# Patient Record
Sex: FEMALE | URBAN - METROPOLITAN AREA
[De-identification: names, ages, dates, MRNs, and addresses within clinical notes are randomized per-mention and may not be internally consistent; named-entity substitution may affect disease eponyms.]

---

## 2021-03-31 ENCOUNTER — TELEPHONE ENCOUNTER (OUTPATIENT)
Dept: URBAN - METROPOLITAN AREA CLINIC 35 | Facility: CLINIC | Age: 66
End: 2021-03-31

## 2022-12-12 ENCOUNTER — OFFICE VISIT (OUTPATIENT)
Dept: URBAN - METROPOLITAN AREA CLINIC 78 | Facility: CLINIC | Age: 67
End: 2022-12-12

## 2022-12-12 NOTE — HPI-TODAY'S VISIT:
The patient was referred to us by. A copy of this note will be sent to the referring physician.  The patient has never had a colonoscopy previously. There is no FH of colon cancer or colon polyps.  There is no recent history of rectal bleeding. The patient has no pertinent additional complaints of abdominal pain, constipation, diarrhea, bloating, rectal pain, anorexia or unintentional weight loss.   Regarding any upper GI complaints, the patient has not had heartburn, nausea, vomiting or dysphagia.    The patient does not take blood thinners.  They deny any CP or FORDE.

## 2023-02-08 ENCOUNTER — OFFICE VISIT (OUTPATIENT)
Dept: URBAN - METROPOLITAN AREA CLINIC 98 | Facility: CLINIC | Age: 68
End: 2023-02-08
Payer: COMMERCIAL

## 2023-02-08 VITALS
HEIGHT: 58 IN | TEMPERATURE: 97.8 F | BODY MASS INDEX: 26.55 KG/M2 | DIASTOLIC BLOOD PRESSURE: 69 MMHG | WEIGHT: 126.5 LBS | HEART RATE: 69 BPM | SYSTOLIC BLOOD PRESSURE: 139 MMHG

## 2023-02-08 DIAGNOSIS — K21.9 GERD WITHOUT ESOPHAGITIS: ICD-10-CM

## 2023-02-08 DIAGNOSIS — Z86.010 PERSONAL HISTORY OF COLONIC POLYPS: ICD-10-CM

## 2023-02-08 DIAGNOSIS — R10.32 LLQ ABDOMINAL PAIN: ICD-10-CM

## 2023-02-08 PROCEDURE — 99203 OFFICE O/P NEW LOW 30 MIN: CPT | Performed by: INTERNAL MEDICINE

## 2023-02-08 PROCEDURE — 99243 OFF/OP CNSLTJ NEW/EST LOW 30: CPT | Performed by: INTERNAL MEDICINE

## 2023-02-08 RX ORDER — OMEPRAZOLE 20 MG/1
1 CAPSULE 30 MINUTES BEFORE MORNING MEAL CAPSULE, DELAYED RELEASE ORAL ONCE A DAY
Qty: 30 | OUTPATIENT
Start: 2023-02-10

## 2023-02-08 RX ORDER — SODIUM, POTASSIUM,MAG SULFATES 17.5-3.13G
354ML SOLUTION, RECONSTITUTED, ORAL ORAL
Qty: 345 MILLILITER | Refills: 0 | OUTPATIENT
Start: 2023-02-12 | End: 2023-02-13

## 2023-02-08 RX ORDER — FAMOTIDINE 20 MG
1 CAPSULE TABLET ORAL ONCE A DAY
Status: ACTIVE | COMMUNITY

## 2023-02-08 NOTE — HPI-TODAY'S VISIT:
Patient referred by Zohra Moreno MD for evaluation of chronic GERD and surveillance colonoscopy. Copy of this consult sent to Dr. Moreno. 66 yo pt w PHx malignant polyp 8 yrs ago in [place]. She has had no LGI symptoms and denies melenic stools nor hematochezia. She has no FHx CC  pp / IBD / GI malignancies. She has ongoing MINAL sxs: sore throat. throat clearing, wo dysphagia / odynophagia / HB / R / or nocturnal MINAL sxs. Has been on Omeprazole + antireflux diet w relatively good control of sxs x for above mentioned. Reports p-prandial abdominal borborygmi wo diarrhea and no abdominal pain Denies anorexia or weight loss. Has been on a healthy diet w high fiber and low sugar - CHO. Normal labs 8/22 x for borderline BG. Has lost ~ 10 lbs / 5 mos w diet changes. She had a normal XTT 2 yrs ago ( no hx CVD ), normal DEXA last yr. Had mild COVID-19 12/22 and has received COVID-19 vaccine. No other complaints.

## 2023-02-08 NOTE — PHYSICAL EXAM NECK/THYROID:
normal appearance , without tenderness upon palpation , no deformities , trachea midline , Thyroid normal size , no masses , thyroid nontender
negative...

## 2023-02-16 ENCOUNTER — TELEPHONE ENCOUNTER (OUTPATIENT)
Dept: URBAN - METROPOLITAN AREA CLINIC 98 | Facility: CLINIC | Age: 68
End: 2023-02-16

## 2023-02-22 PROBLEM — 428283002: Status: ACTIVE | Noted: 2023-02-08

## 2023-02-22 PROBLEM — 266435005: Status: ACTIVE | Noted: 2023-02-08

## 2023-03-21 LAB
A/G RATIO: 1.7
ALBUMIN: 4.3
ALKALINE PHOSPHATASE: 77
ALT (SGPT): 17
APPEARANCE: CLEAR
AST (SGOT): 14
BACTERIA: (no result)
BILIRUBIN, TOTAL: 0.5
BILIRUBIN: NEGATIVE
BUN/CREATININE RATIO: (no result)
BUN: 24
C-REACTIVE PROTEIN, QUANT: 0.7
CALCIUM: 10.2
CARBON DIOXIDE, TOTAL: 27
CHLORIDE: 104
COMMENTS: (no result)
COPY(IES) SENT TO:: (no result)
CREATININE: 0.66
EGFR: 97
GLOBULIN, TOTAL: 2.5
GLUCOSE: 113
GLUCOSE: NEGATIVE
HEMATOCRIT: 43.9
HEMOGLOBIN: 14.4
HYALINE CAST: (no result)
KETONES: NEGATIVE
LIPASE: 46
MCH: 30
MCHC: 32.8
MCV: 91.5
MPV: 12.4
NITRITE, URINE: NEGATIVE
OCCULT BLOOD: NEGATIVE
PH: 6
PLATELET COUNT: 226
POTASSIUM: 4.6
PROTEIN, TOTAL: 6.8
PROTEIN: NEGATIVE
RBC: (no result)
RDW: 13
RED BLOOD CELL COUNT: 4.8
SED RATE BY MODIFIED: 2
SODIUM: 140
SPECIFIC GRAVITY: 1.01
SQUAMOUS EPITHELIAL CELLS: (no result)
URINE-COLOR: YELLOW
WBC ESTERASE: (no result)
WBC: (no result)
WHITE BLOOD CELL COUNT: 6.6

## 2023-03-24 ENCOUNTER — CLAIMS CREATED FROM THE CLAIM WINDOW (OUTPATIENT)
Dept: URBAN - METROPOLITAN AREA CLINIC 4 | Facility: CLINIC | Age: 68
End: 2023-03-24
Payer: COMMERCIAL

## 2023-03-24 ENCOUNTER — CLAIMS CREATED FROM THE CLAIM WINDOW (OUTPATIENT)
Dept: URBAN - METROPOLITAN AREA SURGERY CENTER 18 | Facility: SURGERY CENTER | Age: 68
End: 2023-03-24
Payer: COMMERCIAL

## 2023-03-24 ENCOUNTER — OFFICE VISIT (OUTPATIENT)
Dept: URBAN - METROPOLITAN AREA SURGERY CENTER 18 | Facility: SURGERY CENTER | Age: 68
End: 2023-03-24

## 2023-03-24 DIAGNOSIS — K29.70 GASTRITIS, UNSPECIFIED, WITHOUT BLEEDING: ICD-10-CM

## 2023-03-24 DIAGNOSIS — K31.89 ACQUIRED DEFORMITY OF DUODENUM: ICD-10-CM

## 2023-03-24 DIAGNOSIS — K31.89 OTHER DISEASES OF STOMACH AND DUODENUM: ICD-10-CM

## 2023-03-24 DIAGNOSIS — K63.89 OTHER SPECIFIED DISEASES OF INTESTINE: ICD-10-CM

## 2023-03-24 DIAGNOSIS — Z12.11 COLON CANCER SCREENING: ICD-10-CM

## 2023-03-24 DIAGNOSIS — K44.9 DIAPHRAGMATIC HERNIA: ICD-10-CM

## 2023-03-24 DIAGNOSIS — K22.89 DILATATION OF ESOPHAGUS: ICD-10-CM

## 2023-03-24 PROCEDURE — 88342 IMHCHEM/IMCYTCHM 1ST ANTB: CPT | Performed by: PATHOLOGY

## 2023-03-24 PROCEDURE — 43239 EGD BIOPSY SINGLE/MULTIPLE: CPT | Performed by: INTERNAL MEDICINE

## 2023-03-24 PROCEDURE — 88305 TISSUE EXAM BY PATHOLOGIST: CPT | Performed by: PATHOLOGY

## 2023-03-24 PROCEDURE — G0121 COLON CA SCRN NOT HI RSK IND: HCPCS | Performed by: INTERNAL MEDICINE

## 2023-03-24 PROCEDURE — G8907 PT DOC NO EVENTS ON DISCHARG: HCPCS | Performed by: INTERNAL MEDICINE

## 2023-03-24 PROCEDURE — 88341 IMHCHEM/IMCYTCHM EA ADD ANTB: CPT | Performed by: PATHOLOGY

## 2023-03-24 PROCEDURE — 88312 SPECIAL STAINS GROUP 1: CPT | Performed by: PATHOLOGY

## 2023-04-10 ENCOUNTER — ERX REFILL RESPONSE (OUTPATIENT)
Dept: URBAN - METROPOLITAN AREA CLINIC 111 | Facility: CLINIC | Age: 68
End: 2023-04-10

## 2023-04-10 RX ORDER — OMEPRAZOLE 20 MG/1
TAKE 1 CAPSULE BY MOUTH 30 MINUTES BEFORE MORNING MEAL EVERY DAY CAPSULE, DELAYED RELEASE ORAL
Qty: 90 CAPSULE | Refills: 2 | OUTPATIENT

## 2023-04-26 ENCOUNTER — DASHBOARD ENCOUNTERS (OUTPATIENT)
Age: 68
End: 2023-04-26

## 2023-04-26 ENCOUNTER — LAB OUTSIDE AN ENCOUNTER (OUTPATIENT)
Dept: URBAN - METROPOLITAN AREA CLINIC 98 | Facility: CLINIC | Age: 68
End: 2023-04-26

## 2023-04-26 ENCOUNTER — WEB ENCOUNTER (OUTPATIENT)
Dept: URBAN - METROPOLITAN AREA CLINIC 98 | Facility: CLINIC | Age: 68
End: 2023-04-26

## 2023-04-26 ENCOUNTER — OFFICE VISIT (OUTPATIENT)
Dept: URBAN - METROPOLITAN AREA CLINIC 98 | Facility: CLINIC | Age: 68
End: 2023-04-26
Payer: COMMERCIAL

## 2023-04-26 VITALS
DIASTOLIC BLOOD PRESSURE: 77 MMHG | SYSTOLIC BLOOD PRESSURE: 146 MMHG | HEART RATE: 77 BPM | BODY MASS INDEX: 26.24 KG/M2 | HEIGHT: 58 IN | WEIGHT: 125 LBS | TEMPERATURE: 97.5 F

## 2023-04-26 DIAGNOSIS — K21.9 GERD WITHOUT ESOPHAGITIS: ICD-10-CM

## 2023-04-26 DIAGNOSIS — K57.90 DIVERTICULOSIS: ICD-10-CM

## 2023-04-26 DIAGNOSIS — R10.32 LLQ ABDOMINAL PAIN: ICD-10-CM

## 2023-04-26 PROCEDURE — 99214 OFFICE O/P EST MOD 30 MIN: CPT | Performed by: INTERNAL MEDICINE

## 2023-04-26 RX ORDER — OMEPRAZOLE 20 MG/1
1 CAPSULE 30 MINUTES BEFORE MORNING MEAL CAPSULE, DELAYED RELEASE ORAL ONCE A DAY
Qty: 30 | OUTPATIENT

## 2023-04-26 RX ORDER — OMEPRAZOLE 20 MG/1
TAKE 1 CAPSULE BY MOUTH 30 MINUTES BEFORE MORNING MEAL EVERY DAY CAPSULE, DELAYED RELEASE ORAL
Qty: 90 CAPSULE | Refills: 2 | Status: ACTIVE | COMMUNITY

## 2023-04-26 RX ORDER — FAMOTIDINE 20 MG
1 CAPSULE TABLET ORAL ONCE A DAY
Status: ACTIVE | COMMUNITY

## 2023-04-26 NOTE — HPI-TODAY'S VISIT:
66 yo pt w PHx malignant polyp 8 yrs ago in [place], here for follow up after surveillance colonoscopy / EGD 3/23: Colon: fair prep and L-diverticulosis. EGD: inlet patch, NERD, sm HH and gastritis She has no MINAL sxs but still w p-prandial gas,bloating and abdominal distention.   Has been on Omeprazole 40 mg po qd + antireflux diet w relatively good control of sxs x for above mentioned. Denies anorexia or weight loss. Has been on a healthy diet w high fiber and low sugar - CHO. Normal labs 8/22 x for borderline BG. Has lost ~ 10 lbs / 5 mos w diet changes. She had a normal XTT 2 yrs ago ( no hx CVD ), normal DEXA last yr. Had mild COVID-19 12/22 and has received COVID-19 vaccine. No other complaints.

## 2023-04-27 PROBLEM — 397881000: Status: ACTIVE | Noted: 2023-04-27
